# Patient Record
Sex: MALE | Race: WHITE
[De-identification: names, ages, dates, MRNs, and addresses within clinical notes are randomized per-mention and may not be internally consistent; named-entity substitution may affect disease eponyms.]

---

## 2018-10-17 ENCOUNTER — HOSPITAL ENCOUNTER (OUTPATIENT)
Dept: HOSPITAL 47 - RADUSWWP | Age: 63
Discharge: HOME | End: 2018-10-17
Attending: FAMILY MEDICINE
Payer: COMMERCIAL

## 2018-10-17 DIAGNOSIS — I83.92: Primary | ICD-10-CM

## 2018-10-17 NOTE — US
EXAMINATION TYPE: US venous doppler duplex LE LT

 

DATE OF EXAM: 10/17/2018 2:34 PM

 

COMPARISON: NONE

 

CLINICAL HISTORY: 63-year-old male M79.662 pain in left calf. Left lower leg skin redness and pain in
 linear course medially' taking antibiotic now

 

SIDE PERFORMED: Left  

 

TECHNIQUE:  The lower extremity deep venous system is examined utilizing real time linear array sonog
tosin with graded compression, doppler sonography and color-flow sonography.

 

FINDINGS:

 

VESSELS IMAGED:

 

Common Femoral Vein

Deep Femoral Vein

Greater Saphenous Vein *

Femoral Vein

Popliteal Vein

Small Saphenous Vein *

Proximal Calf Veins

(* superficial vessels)

 

 

Left Leg:  Negative for DVT. Left leg is positive for superficial vein thrombosis at Greater Saphenou
s Vein location from BK to ankle.

 

 

 

IMPRESSION:

1. No evidence for DVT within the left lower extremity imaged from the groin to the upper calf.

2. However, dilated and noncompressible tortuous structures extending from below the knee to the ankl
e is compatible with SVT of the greater saphenous vein.